# Patient Record
Sex: MALE | Race: WHITE
[De-identification: names, ages, dates, MRNs, and addresses within clinical notes are randomized per-mention and may not be internally consistent; named-entity substitution may affect disease eponyms.]

---

## 2018-01-05 ENCOUNTER — HOSPITAL ENCOUNTER (EMERGENCY)
Dept: HOSPITAL 7 - FB.ED | Age: 48
Discharge: HOME | End: 2018-01-05
Payer: COMMERCIAL

## 2018-01-05 DIAGNOSIS — I10: ICD-10-CM

## 2018-01-05 DIAGNOSIS — K21.9: Primary | ICD-10-CM

## 2018-01-05 DIAGNOSIS — Z87.891: ICD-10-CM

## 2018-01-05 DIAGNOSIS — Z79.899: ICD-10-CM

## 2018-01-05 PROCEDURE — 83036 HEMOGLOBIN GLYCOSYLATED A1C: CPT

## 2018-01-05 PROCEDURE — 36415 COLL VENOUS BLD VENIPUNCTURE: CPT

## 2018-01-05 PROCEDURE — 85610 PROTHROMBIN TIME: CPT

## 2018-01-05 PROCEDURE — 84484 ASSAY OF TROPONIN QUANT: CPT

## 2018-01-05 PROCEDURE — 87081 CULTURE SCREEN ONLY: CPT

## 2018-01-05 PROCEDURE — 87430 STREP A AG IA: CPT

## 2018-01-05 PROCEDURE — 70360 X-RAY EXAM OF NECK: CPT

## 2018-01-05 PROCEDURE — 85025 COMPLETE CBC W/AUTO DIFF WBC: CPT

## 2018-01-05 PROCEDURE — 71046 X-RAY EXAM CHEST 2 VIEWS: CPT

## 2018-01-05 PROCEDURE — 80048 BASIC METABOLIC PNL TOTAL CA: CPT

## 2018-01-05 PROCEDURE — 93005 ELECTROCARDIOGRAM TRACING: CPT

## 2018-01-05 PROCEDURE — 99284 EMERGENCY DEPT VISIT MOD MDM: CPT

## 2018-01-05 NOTE — CR
INDICATION:  Dysphagia.  



NECK SOFT TISSUE:  Frontal and lateral views of the neck for soft tissue 
revealed prevertebral space to appear normal.  



The airway appears to be normal.  



Decreased disk space is noted at the C5-6 level to a mild to moderate degree.  



IMPRESSION:  No airway obstruction identified.  No definite foreign body 
identified.  



MTDD

## 2018-01-05 NOTE — EDM.PDOC
ED HPI GENERAL MEDICAL PROBLEM





- General


Chief Complaint: Gastrointestinal Problem


Stated Complaint: CHEST PAIN


Time Seen by Provider: 01/05/18 12:52


Source of Information: Reports: Patient, Family


History Limitations: Reports: No Limitations





- History of Present Illness


INITIAL COMMENTS - FREE TEXT/NARRATIVE: 





47 y.o.w.m witha h/o HTN, came to the ed after he noticed neck pressure/

dysphagia and poss aspiration while eating breakfast. The symptoms were lasting 

about 20 seconds. Pt was in his usual state of health as he arrived here in the 

ED. was able to drink water. No C/P, no N/V/D or any other acute medical 

issues. Pt did not take his BP meds today. /102  pulse 89 Temp 37.1 Pulse 

ox 99% on RA.


Onset: Today


Onset Date: 01/05/18


Onset Time: 08:00


Duration: Resolved Prior to Arrival


Location: Reports: Neck


Quality: Reports: Dull (lasting a few seconds, subsiding spontaneously)


Severity: Mild


Improves with: Reports: Rest


Worsens with: Reports: Eating





- Related Data


 Allergies











Allergy/AdvReac Type Severity Reaction Status Date / Time


 


No Known Allergies Allergy   Verified 01/05/18 13:12











Home Meds: 


 Home Meds





Lisinopril 10 mg PO DAILY 09/14/14 [History]


Omeprazole 40 mg PO BEDTIME #20 cap.sr 01/05/18 [Rx]











Past Medical History


Cardiovascular History: Reports: Hypertension





- Past Surgical History


Musculoskeletal Surgical History: Reports: Other (See Below)


Other Musculoskeletal Surgeries/Procedures:: rigth knee surgery.





Social & Family History





- Family History


Family Medical History: Noncontributory





- Tobacco Use


Smoking Status *Q: Never Smoker


Years of Tobacco use: 20


Used Tobacco, but Quit: Yes


Month Tobacco Last Used: 2012





- Caffeine Use


Caffeine Use: Reports: Coffee, Energy Drinks, Soda





- Alcohol Use


Days Per Week of Alcohol Use: 0





- Recreational Drug Use


Recreational Drug Use: No





ED ROS GENERAL





- Review of Systems


Review Of Systems: See Below


Constitutional: Reports: No Symptoms


HEENT: Reports: No Symptoms


Respiratory: Reports: No Symptoms


Cardiovascular: Reports: No Symptoms


Endocrine: Reports: No Symptoms


GI/Abdominal: Reports: No Symptoms, Constipation


: Reports: No Symptoms


Musculoskeletal: Reports: No Symptoms


Skin: Reports: No Symptoms


Neurological: Reports: No Symptoms


Psychiatric: Reports: No Symptoms


Hematologic/Lymphatic: Reports: No Symptoms


Immunologic: Reports: No Symptoms





ED EXAM, GI/ABD





- Physical Exam


Exam: See Below


Exam Limited By: No Limitations


General Appearance: Alert, WD/WN, No Apparent Distress, Obese (morbid)


Eyes: Bilateral: Normal Appearance


Ears: Normal External Exam


Nose: Normal Inspection, Normal Mucosa, No Blood


Throat/Mouth: Normal Inspection, Normal Lips


Head: Atraumatic, Normocephalic, Facial Swelling


Neck: Normal Inspection


Respiratory/Chest: No Respiratory Distress, Lungs Clear, Normal Breath Sounds, 

Chest Non-Tender


Cardiovascular: Normal Peripheral Pulses, Regular Rate, Rhythm, No Edema, No 

Gallop, No Murmur, No Rub


GI/Abdominal Exam: Normal Bowel Sounds, Soft, Non-Tender, No Organomegaly, No 

Abnormal Bruit, No Mass


 (Male) Exam: No Hernia, Normal Inspection


Rectal (Males) Exam: Deferred


Back Exam: Normal Inspection, Full Range of Motion


Extremities: Normal Inspection, Normal Range of Motion


Neurological: Alert, Oriented, CN II-XII Intact, Normal Cognition, Normal Gait


Psychiatric: Normal Affect, Normal Mood


Skin Exam: Warm, Dry, Intact, Normal Color, No Rash


Lymphatic: No Adenopathy





EKG INTERPRETATION


EKG Date: 01/05/18


Time: 13:10


Rhythm: NSR


Rate (Beats/Min): 80


Axis: Normal


P-Wave: Present


QRS: Normal


ST-T: Normal


QT: Normal


Comparison: NA - No Prior EKG





Course





- Vital Signs


Text/Narrative:: 








47 y.o.w.m witha h/o HTN, came to the ed after he noticed neck pressure/

dysphagia and poss aspiration while eating breakfast. The symptoms were lasting 

about 20 seconds. Pt was in his usual state of health as he arrived here in the 

ED. was able to drink water. No C/P, no N/V/D or any other acute medical 

issues. Pt did not take his BP meds today. /102  pulse 89 Temp 37.1 Pulse 

ox 99% on RA.


PE; Morbid obese, in his usual state of health


Labs: CBC, BMP and Tropnin  (0.017) were nl


ECG: NSR


Imaging: CXR and soft tissue neck were neg, official report is pending


Impression: Gastric reflux disease


Tx: Protonix


Reexam: Improved


Plan: D/C with instructions


Last Recorded V/S: 


 Last Vital Signs











Temp  36.8 C   01/05/18 12:52


 


Pulse  75   01/05/18 13:24


 


Resp  16   01/05/18 14:40


 


BP  125/77   01/05/18 14:40


 


Pulse Ox  98   01/05/18 14:40














- Orders/Labs/Meds


Orders: 


 Active Orders 24 hr











 Category Date Time Status


 


 CULTURE STREP A CONFIRMATION [RM] Stat Lab  01/05/18 13:15 Results


 


 STREP SCRN A RAPID W CULT CONF [RM] Stat Lab  01/05/18 13:15 Results


 


 EKG 12 Lead [EK] Routine Ther  01/05/18 13:10 Ordered











Labs: 


 Laboratory Tests











  01/05/18 01/05/18 01/05/18 Range/Units





  13:20 13:20 13:20 


 


WBC  6.5    (4.5-12.0)  X10-3/uL


 


RBC  4.95    (4.30-5.75)  x10(6)uL


 


Hgb  14.5    (11.5-15.5)  g/dL


 


Hct  42.6    (30.0-51.3)  %


 


MCV  86.0    (80-96)  fL


 


MCH  29.3    (27.7-33.6)  pg


 


MCHC  34.1    (32.2-35.4)  g/dL


 


RDW  13.2    (11.5-15.5)  %


 


Plt Count  157    (125-369)  X10(3)uL


 


MPV  11.9 H    (7.4-10.4)  fL


 


Neut % (Auto)  57.5    (46-82)  %


 


Lymph % (Auto)  32.5    (13-37)  %


 


Mono % (Auto)  6.1    (4-12)  %


 


Eos % (Auto)  3    (1.0-5.0)  %


 


Baso % (Auto)  1    (0-2)  %


 


Neut # (Auto)  3.7    (1.6-8.3)  #


 


Lymph # (Auto)  2.1    (0.6-5.0)  #


 


Mono # (Auto)  0.4    (0.0-1.3)  #


 


Eos # (Auto)  0.2    (0.0-0.8)  #


 


Baso # (Auto)  0.1    (0.0-0.2)  #


 


PT   11.6 H   (8.7-11.1)  


 


INR   1.15 H   (0.89-1.13)  


 


Sodium    143  (135-145)  mmol/L


 


Potassium    3.5  (3.5-5.3)  mmol/L


 


Chloride    105  (100-110)  mmol/L


 


Carbon Dioxide    27  (21-32)  mmol/L


 


BUN    13  (7-18)  mg/dL


 


Creatinine    1.0  (0.70-1.30)  mg/dL


 


Est Cr Clr Drug Dosing    TNP  


 


Estimated GFR (MDRD)    > 60  (>60)  


 


BUN/Creatinine Ratio    13.0  (9-20)  


 


Glucose    145 H  ()  mg/dL


 


Hemoglobin A1c     (4.5-6.2)  %


 


Calcium    8.6  (8.6-10.2)  mg/dL


 


Troponin I     (<0.017-0.056)  ng/mL














  01/05/18 01/05/18 Range/Units





  13:20 13:20 


 


WBC    (4.5-12.0)  X10-3/uL


 


RBC    (4.30-5.75)  x10(6)uL


 


Hgb    (11.5-15.5)  g/dL


 


Hct    (30.0-51.3)  %


 


MCV    (80-96)  fL


 


MCH    (27.7-33.6)  pg


 


MCHC    (32.2-35.4)  g/dL


 


RDW    (11.5-15.5)  %


 


Plt Count    (125-369)  X10(3)uL


 


MPV    (7.4-10.4)  fL


 


Neut % (Auto)    (46-82)  %


 


Lymph % (Auto)    (13-37)  %


 


Mono % (Auto)    (4-12)  %


 


Eos % (Auto)    (1.0-5.0)  %


 


Baso % (Auto)    (0-2)  %


 


Neut # (Auto)    (1.6-8.3)  #


 


Lymph # (Auto)    (0.6-5.0)  #


 


Mono # (Auto)    (0.0-1.3)  #


 


Eos # (Auto)    (0.0-0.8)  #


 


Baso # (Auto)    (0.0-0.2)  #


 


PT    (8.7-11.1)  


 


INR    (0.89-1.13)  


 


Sodium    (135-145)  mmol/L


 


Potassium    (3.5-5.3)  mmol/L


 


Chloride    (100-110)  mmol/L


 


Carbon Dioxide    (21-32)  mmol/L


 


BUN    (7-18)  mg/dL


 


Creatinine    (0.70-1.30)  mg/dL


 


Est Cr Clr Drug Dosing    


 


Estimated GFR (MDRD)    (>60)  


 


BUN/Creatinine Ratio    (9-20)  


 


Glucose    ()  mg/dL


 


Hemoglobin A1c   6.1  (4.5-6.2)  %


 


Calcium    (8.6-10.2)  mg/dL


 


Troponin I  < 0.017 L   (<0.017-0.056)  ng/mL











Meds: 


Medications














Discontinued Medications














Generic Name Dose Route Start Last Admin





  Trade Name Freq  PRN Reason Stop Dose Admin


 


Pantoprazole Sodium  40 mg  01/05/18 14:20  01/05/18 14:25





  Protonix***  PO  01/05/18 14:21  40 mg





  ONETIME STA   Administration














Departure





- Departure


Time of Disposition: 14:21


Disposition: Home, Self-Care 01


Condition: Good


Clinical Impression: 


Acid reflux


Qualifiers:


 Esophagitis presence: esophagitis presence not specified Qualified Code(s): 

K21.9 - Gastro-esophageal reflux disease without esophagitis





HTN (hypertension)


Qualifiers:


 Hypertension type: essential hypertension Qualified Code(s): I10 - Essential (

primary) hypertension








- Discharge Information


Prescriptions: 


Omeprazole 40 mg PO BEDTIME #20 cap.sr


Instructions:  Gastroesophageal Reflux Disease, Adult, Easy-to-Read


Referrals: 


Jimmy Vasquez MD [Primary Care Provider] - 


Girish Naranjo MD [Physician] - 


Forms:  ED Department Discharge


Additional Instructions: 


Please f/u with Dr. Naranjo for evaluation of gastric reflux disease.Please take 

omeprazole daily. Please elevate your head 30 degree at  night, last meal 3 

hours before bed time. Please f/u with your PMD in the next 3 days. Please come 

back if your symptoms get worse acutely.Cardiac stress test 1/10/2018 9.45 am, 

please see Dr. Naranjo on 1/8/2018 9.15 am.





- My Orders


Last 24 Hours: 


My Active Orders





01/05/18 13:10


EKG 12 Lead [EK] Routine 





01/05/18 13:15


CULTURE STREP A CONFIRMATION [RM] Stat 


STREP SCRN A RAPID W CULT CONF [RM] Stat 














- Assessment/Plan


Last 24 Hours: 


My Active Orders





01/05/18 13:10


EKG 12 Lead [EK] Routine 





01/05/18 13:15


CULTURE STREP A CONFIRMATION [RM] Stat 


STREP SCRN A RAPID W CULT CONF [RM] Stat

## 2018-01-05 NOTE — CR
INDICATION:  Chest pain.  



CHEST:  Two lateral views and a PA view of the chest were obtained and revealed 
evidence of exogenous obesity. 



The heart did not appear grossly enlarged.  The aorta appears to be somewhat 
tortuous proximally.  



Overlying EKG leads are noted. 



Bony structures appear to be fairly intact. 



An active infiltrate or effusion was not identified. 



IMPRESSION:  No acute process.  
MTDD